# Patient Record
Sex: FEMALE | Race: WHITE | NOT HISPANIC OR LATINO | Employment: UNEMPLOYED | URBAN - METROPOLITAN AREA
[De-identification: names, ages, dates, MRNs, and addresses within clinical notes are randomized per-mention and may not be internally consistent; named-entity substitution may affect disease eponyms.]

---

## 2018-01-05 ENCOUNTER — OFFICE VISIT (OUTPATIENT)
Dept: URGENT CARE | Facility: URGENT CARE | Age: 23
End: 2018-01-05
Payer: COMMERCIAL

## 2018-01-05 VITALS
OXYGEN SATURATION: 97 % | HEIGHT: 59 IN | HEART RATE: 91 BPM | WEIGHT: 99 LBS | DIASTOLIC BLOOD PRESSURE: 81 MMHG | SYSTOLIC BLOOD PRESSURE: 120 MMHG | TEMPERATURE: 98.4 F | BODY MASS INDEX: 19.96 KG/M2

## 2018-01-05 DIAGNOSIS — J10.1 INFLUENZA A: Primary | ICD-10-CM

## 2018-01-05 LAB
FLUAV+FLUBV AG SPEC QL: NEGATIVE
FLUAV+FLUBV AG SPEC QL: POSITIVE
SPECIMEN SOURCE: ABNORMAL

## 2018-01-05 PROCEDURE — 87804 INFLUENZA ASSAY W/OPTIC: CPT | Performed by: PHYSICIAN ASSISTANT

## 2018-01-05 PROCEDURE — 99203 OFFICE O/P NEW LOW 30 MIN: CPT | Performed by: PHYSICIAN ASSISTANT

## 2018-01-05 RX ORDER — OSELTAMIVIR PHOSPHATE 75 MG/1
75 CAPSULE ORAL 2 TIMES DAILY
Qty: 10 CAPSULE | Refills: 0 | Status: SHIPPED | OUTPATIENT
Start: 2018-01-05 | End: 2018-01-10

## 2018-01-05 NOTE — MR AVS SNAPSHOT
"              After Visit Summary   2018    Oralia Patton    MRN: [id number]           Patient Information     Date Of Birth          1995        Visit Information        Provider Department      2018 5:40 PM Deidra Bernardo PA-C [facility]        Today's Diagnoses     Influenza A    -  1       Follow-ups after your visit        Who to contact     If you have questions or need follow up information about today's clinic visit or your schedule please contact [facility] directly at [phone].  Normal or non-critical lab and imaging results will be communicated to you by [employer]hart, letter or phone within 4 business days after the clinic has received the results. If you do not hear from us within 7 days, please contact the clinic through [place] or phone. If you have a critical or abnormal lab result, we will notify you by phone as soon as possible.  Submit refill requests through [employer] or call your pharmacy and they will forward the refill request to us. Please allow 3 business days for your refill to be completed.          Additional Information About Your Visit        MyChart Information     [employer]box lets you send messages to your doctor, view your test results, renew your prescriptions, schedule appointments and more. To sign up, go to [email or web address] . Click on \"Log in\" on the left side of the screen, which will take you to the Welcome page. Then click on \"Sign up Now\" on the right side of the page.     You will be asked to enter the access code listed below, as well as some personal information. Please follow the directions to create your username and password.     Your access code is: [email or web address]  Expires: 2018  8:46 PM     Your access code will  in 90 days. If you need help or a new code, please call your [facility] clinic or [phone].        Care EveryWhere ID     This is your Care EveryWhere ID. This could be used by other " "organizations to access your [facility] medical records  [id number]        Your Vitals Were     Pulse Temperature Height Last Period Pulse Oximetry Breastfeeding?    91 98.4  F (36.9  C) (Tympanic) 4' 11\" (1.499 m) 01/05/2018 97% No    BMI (Body Mass Index)                   20 kg/m2            Blood Pressure from Last 3 Encounters:   01/05/18 120/81   11/11/13 108/71    Weight from Last 3 Encounters:   01/05/18 99 lb (44.9 kg)   11/11/13 100 lb (45.4 kg) (3 %)*     * Growth percentiles are based on [facility] 2-20 Years data.              We Performed the Following     Influenza A/B antigen          Today's Medication Changes          These changes are accurate as of: 1/5/18  8:46 PM.  If you have any questions, ask your nurse or doctor.               Start taking these medicines.        Dose/Directions    oseltamivir 75 MG capsule   Commonly known as:  TAMIFLU   Used for:  Influenza A   Started by:  Deidra Bernardo PA-C        Dose:  75 mg   Take 1 capsule (75 mg) by mouth 2 times daily for 5 days   Quantity:  10 capsule   Refills:  0            Where to get your medicines      These medications were sent to [place] [address] [place] [address]     Phone:  [phone]     oseltamivir 75 MG capsule                Primary Care Provider Fax #    Physician No Ref-Primary [phone]       No address on file        Equal Access to Services     FRAN GRAHAM AH: Hadii jerri cox Somaikol, waaxda luqadaha, qaybta kaalmada adeyordanyada, daryl marshall . So [facility] [place] [facility] [phone].    ATENCIÓN: Si habla español, tiene a ibrahim disposición servicios gratuitos de asistencia lingüística. Llame al [phone].    We comply with applicable federal civil rights laws and [place] laws. We do not discriminate on the basis of race, color, national origin, age, disability, sex, sexual orientation, or gender identity.            Thank you!     Thank you for " choosing [facility]  for your care. Our goal is always to provide you with excellent care. Hearing back from our patients is one way we can continue to improve our services. Please take a few minutes to complete the written survey that you may receive in the mail after your visit with us. Thank you!             Your Updated Medication List - Protect others around you: Learn how to safely use, store and throw away your medicines at www.disposemymeds.org.          This list is accurate as of: 1/5/18  8:46 PM.  Always use your most recent med list.                   Brand Name Dispense Instructions for use Diagnosis    DEPO-PROVERA IM           diphenhydrAMINE 25 MG tablet    BENADRYL    10 tablet    Take 1 tablet (25 mg) by mouth every 6 hours as needed (Take with Compazine only if you have a headache.)        oseltamivir 75 MG capsule    TAMIFLU    10 capsule    Take 1 capsule (75 mg) by mouth 2 times daily for 5 days    Influenza A       prochlorperazine 10 MG tablet    COMPAZINE    10 tablet    Take 1 tablet (10 mg) by mouth every 6 hours as needed

## 2018-01-06 NOTE — PROGRESS NOTES
"HPI:  Oralia is a 21 yo female who presents for body aches, chills, feeling feverish x 2 days.  No temperature was taken.   Reports  Mild cough and mild sore throat.  Has been sweating a lot at night.  Patient took tylenol which helped.  She then took Ibuprofen today and her stomach started to feel an uncomfortable \"hunger type macarena\" and eventually she vomited.  This made her feel somewhat better and she is starting to feel that \"macarena\" again.  Pain is not severe.  Denies diarrhea.    Did not have flu shot this year.    ROS:  See HPI      PE:  Vitals & nursing notes reviewed. B/P: 120/81, T: 98.4, P: 91, R: Data Unavailable  Constitutional:  Alert, well nourished, well-developed, NAD  Head:  Atraumatic, normocephalic  Eyes:  Perrla, EOMI, conjunctiva:  Pink   Sclera:  Anicteric  Ears:  Canals clear BL, TM pearly BL  Throat:  No erythema, exudates, or edema to postoropharynx  Neck:  Supple, no cervical LAD  Lungs:  CTA, no wheezes, rhonchi, or rales  CV:  RRR,  no murmur appreciated  Abdomen:  Soft, Mild mid epigastric TTP, No HSM, No CVA tenderness, (+) bowel sounds,        ASSESSMENT:  (J10.1) Influenza A  (primary encounter diagnosis)  Plan: oseltamivir (TAMIFLU) 75 MG capsule        Rest.  Push fluids.  Tylenol or advil for pain, muscles aches, HA, & fever PRN.  Cool compress to forehead and back of neck to help reduce fevers.  Patient given printed handout on Influenza description and management per epic references.  Contagious - stay out of school and work for 5-7 days.  Must be afebrile for 24 hrs without use of antipyretics.    F/U with PCP if sx persist or worsen.           "

## 2018-01-06 NOTE — NURSING NOTE
"Chief Complaint   Patient presents with     Urgent Care     Nasal Congestion     c/o nasal congestion,bodyache and chills for 4 days       Initial /81  Pulse 91  Temp 98.4  F (36.9  C) (Tympanic)  Ht 4' 11\" (1.499 m)  Wt 99 lb (44.9 kg)  LMP 01/05/2018  SpO2 97%  Breastfeeding? No  BMI 20 kg/m2 Estimated body mass index is 20 kg/(m^2) as calculated from the following:    Height as of this encounter: 4' 11\" (1.499 m).    Weight as of this encounter: 99 lb (44.9 kg).  Medication Reconciliation: complete   Ivanna Rodriguez MA    "

## 2018-12-13 ASSESSMENT — MIFFLIN-ST. JEOR: SCORE: 1094.23

## 2018-12-14 ENCOUNTER — SURGERY - HEALTHEAST (OUTPATIENT)
Dept: SURGERY | Facility: CLINIC | Age: 23
End: 2018-12-14

## 2018-12-14 ENCOUNTER — ANESTHESIA - HEALTHEAST (OUTPATIENT)
Dept: SURGERY | Facility: CLINIC | Age: 23
End: 2018-12-14

## 2018-12-14 ASSESSMENT — MIFFLIN-ST. JEOR: SCORE: 1072

## 2018-12-17 ENCOUNTER — TELEPHONE (OUTPATIENT)
Dept: FAMILY MEDICINE | Facility: CLINIC | Age: 23
End: 2018-12-17

## 2018-12-17 NOTE — TELEPHONE ENCOUNTER
Called patient and left a message to call the clinic back, wanted to see how she was doing since she was discharged from the hospital.

## 2018-12-18 NOTE — TELEPHONE ENCOUNTER
Date of discharge: 12/14/18  Facility of discharge: [facility]  Patient concerns about condition: No concerns at this time.  Patient concerns about medications: No concerns at this time.  Full med reconciliation will be completed at clinic visit.  Patient concerns about transitioning: No concerns at this time.  Clinic office visit appointment date: 12/20/18 at 9 am w/ Dr. Oewns  Patient reminded to bring all medications (prescription and over-the-counter) to clinic appointment: Yes    BITA Jeff

## 2018-12-20 ENCOUNTER — OFFICE VISIT (OUTPATIENT)
Dept: PHARMACY | Facility: CLINIC | Age: 23
End: 2018-12-20
Payer: COMMERCIAL

## 2018-12-20 ENCOUNTER — OFFICE VISIT (OUTPATIENT)
Dept: FAMILY MEDICINE | Facility: CLINIC | Age: 23
End: 2018-12-20
Payer: COMMERCIAL

## 2018-12-20 VITALS
BODY MASS INDEX: 19.52 KG/M2 | HEIGHT: 58 IN | SYSTOLIC BLOOD PRESSURE: 94 MMHG | OXYGEN SATURATION: 95 % | TEMPERATURE: 97.9 F | RESPIRATION RATE: 16 BRPM | DIASTOLIC BLOOD PRESSURE: 62 MMHG | HEART RATE: 77 BPM | WEIGHT: 93 LBS

## 2018-12-20 VITALS
WEIGHT: 93 LBS | SYSTOLIC BLOOD PRESSURE: 94 MMHG | HEART RATE: 77 BPM | OXYGEN SATURATION: 95 % | TEMPERATURE: 97.9 F | RESPIRATION RATE: 16 BRPM | HEIGHT: 58 IN | DIASTOLIC BLOOD PRESSURE: 62 MMHG | BODY MASS INDEX: 19.52 KG/M2

## 2018-12-20 DIAGNOSIS — Z09 HOSPITAL DISCHARGE FOLLOW-UP: Primary | ICD-10-CM

## 2018-12-20 DIAGNOSIS — Z90.49 S/P APPENDECTOMY: ICD-10-CM

## 2018-12-20 PROBLEM — R33.9 RETENTION OF URINE: Status: RESOLVED | Noted: 2018-12-14 | Resolved: 2018-12-20

## 2018-12-20 PROBLEM — E87.6 HYPOKALEMIA: Status: RESOLVED | Noted: 2018-12-14 | Resolved: 2018-12-20

## 2018-12-20 PROBLEM — R33.9 RETENTION OF URINE: Status: ACTIVE | Noted: 2018-12-14

## 2018-12-20 PROBLEM — E87.6 HYPOKALEMIA: Status: ACTIVE | Noted: 2018-12-14

## 2018-12-20 ASSESSMENT — MIFFLIN-ST. JEOR
SCORE: 1060.57
SCORE: 1060.57

## 2018-12-20 NOTE — PROGRESS NOTES
SUBJECTIVE       Oralia Patton is a 23 year old female with a PMH significant for   Patient Active Problem List   Diagnosis     Chronic daily headache     Hypokalemia     Retention of urine     Short stature     S/P appendectomy   who presents for a post hospitalization follow up.     Hospital Follow-up Visit:    Hospital:  [facility]   Date of Admission: 12/14/18  Date of Discharge: 12/15/18  Reason(s) for Admission: Appendicitis     Follow up hospitaliztion  Initially presented with nausea, vomiting in the setting of increased alcohol intake. Started having abdominal pain. Had  Surgery same day and appendectomy, surgery went well. Had some urinary retention issue post surgery. Has been doing well. Has had some abdominal soreness since then but tolerable. No fevers or chills. No nausea. Vomiting or diarrhea. Discussed pathology reports that showed fibrosis and atrophy of the resected region.     She denies any significant medical history. She has had intermittent headache and does have a history of migraines in the past. She had [facility] about [facility] and [facility] ago[facility] She denies any side effects from this.             Problems taking medications regularly:  None       Post Discharge Medication Reconciliation: discharge medications reconciled and changed, per note/orders (see AVS).       Problems adhering to non-medication therapy:  None       Medications reviewed by: by PharmD    Summary of hospitalization:  [facility] hospital discharge summary reviewed  Diagnostic Tests/Treatments reviewed.  Follow up needed: none  Other Healthcare Providers Involved in Patient s Care:         None  Update since discharge: improved.  Plan of care communicated with patient          Patient speaks English, so an  was not used.    Medications and problem list have been reviewed and updated.         REVIEW OF SYSTEMS     General: No fevers, chills  Head: No headache, dizziness  Neck: No  "swallowing problems   Resp: No cough. No congestion, coryza  GI: No constipation, diarrhea, no nausea or vomiting  Skin: No rash    Family history  Denies any family history of illnesses.         OBJECTIVE     Vitals:    12/20/18 0915   BP: 94/62   Pulse: 77   Resp: 16   Temp: 97.9  F (36.6  C)   TempSrc: Oral   SpO2: 95%   Weight: 42.2 kg (93 lb)   Height: 1.48 m (4' 10.25\")     Body mass index is 19.27 kg/m .  Gen:  In NAD, good color, appears well hydrated  HEENT: No Scleral icterus or conjunctival injection  Neck: supple without lymphadenopathy  CV:  RRR  - no murmurs, age appropriate rate  Pulm:  CTAB, no wheezes/rales/rhonchi, good air entry   Abdomen: soft, nontender, no masses, no rebound, BS intact throughout. Notable for 3 port sites that appear to be well healing.   Skin: No rashes or lesions noted.    ASSESSMENT AND PLAN     Oralia was seen today for hospital f/u.    Diagnoses and all orders for this visit:    S/P appendectomy  Patient doing well after her appendectomy, doing well and symptoms now much improved.  Sites well healed C/D/I. Path reports showed that she had fibrosis and atrophy of the appendix, did not mention necrosis or rupture. Patient is new to our clinic and would like to continue follow up here. She is due for a pap and is not sure if she every had one. She will schedule an appointment to follow up on this.   - Will schedule an appointment to set up for full physical.     Options for treatment and/or follow-up care were reviewed with the patient who was engaged and actively involved in the decision making process and verbalized understanding of the options discussed and was satisfied with the final plan. Risks and benefits of plan were carefully reviewed.     Bill code 81952 if patient readmitted    I, Ayush Gaxiola, have discussed patient findings with attending physician Sulaiman Parra MD who was agreeable with plan.     Ayush Gaxiola, PGY2            "

## 2018-12-20 NOTE — PROGRESS NOTES
Transitional Care / Medication Management Note                                                       Orlaia was referred by Dr. Gaxiola for pharmacy services for TCM.    MEDICATION REVIEW:  Discussed all medication indications, dosage and effectiveness, adverse effects, and adherence with patient/caregiver.    Pt had meds with them: no  Pt had med list with them: no  Pt was knowledgeable about meds: yes  Medications set up by: Self  Medications administered by someone else (e.g., LTCF): No  Pt uses a medication box or automated dispenser: no  Called pharmacy to obtain or clarify med list:  no  Called HHN or LTCF to obtain or clarify med list:  no  Patient has been seen by PharmD in past 6 months:  no   Needed: no    Medication Discrepancies  Medications on EMR med list that pt is NOT taking:  yes, Benadryl, Medroxyprogesterone, Prochlorperazine  Medications pt IS taking that are NOT on EMR med list (e.g., from specialist, hospital): none  OTC meds/ dietary supplements pt taking on own that are NOT on EMR med list:  yes, Excedrin migraine  Dosage listed differently than how patient is taking: none  Frequency listed differently than how patient is taking: none  Duplicate medication on list (two occurrences of the same medication):  none  TOTAL NUMBER OF MEDICATION DISCREPANCIES:  4    The following medications were added in the hospital:    None  The following medications were discontinued in the hospital:    N/A  The following medications had dose/frequency changes in the hospital:    N/A    Subjective                                                       Patient reports the following problems or concerns with their medications:  none  Patient reports the following adverse reactions to medications:  none  Pt reports missing doses:  never  Additional subjective information (e.g., reason for visit, frequency of PRNs, reasons meds were D/C ed):    Post hospitalization follow-up due to appendectomy    Pt does  not take any meds on a daily basis    [facility]    Takes Excedrin migraine PRN about once a month for headaches    Objective                                                       Patient Active Problem List   Diagnosis     Chronic daily headache     Hypokalemia     Retention of urine     Short stature     S/P appendectomy       Current Outpatient Medications   Medication Sig Dispense Refill     MedroxyPROGESTERone Acetate (DEPO-PROVERA IM)        prochlorperazine (COMPAZINE) 10 MG tablet Take 1 tablet (10 mg) by mouth every 6 hours as needed (Patient not taking: Reported on 1/5/2018) 10 tablet 1       Social History     Tobacco Use     Smoking status: Never Smoker     Smokeless tobacco: Never Used   Substance Use Topics     Alcohol use: Not on file     Drug use: Not on file       CrCl cannot be calculated (No order found.).    No results found for: A1C  Last Comprehensive Metabolic Panel:  No results found for: NA, POTASSIUM, CHLORIDE, CO2, ANIONGAP, GLC, BUN, CR, GFRESTIMATED, JOSE    BP Readings from Last 3 Encounters:   12/20/18 94/62   12/20/18 94/62   01/05/18 120/81       The ASCVD Risk score (Yosef LOPEZ Jr., et al., 2013) failed to calculate for the following reasons:    The 2013 ASCVD risk score is only valid for ages 40 to 79    PHQ-9 score:  No flowsheet data found.      Assessment                                                       Med Rec - controlled     New pt with no chronic meds    Plan/Recommendations                                                       Updated medication list in the EMR; deleted meds patient no longer taking and added meds patient is now taking, and changed doses where there was a dose discrepancy.    All medications were reviewed and found to be indicated, effective, safe and convenient/ affordable unless drug therapy problem(s) was/were identified, as are described below.      Completed at this visit    Med Rec     Clarified all  meds and updated med list    Options for treatment and/or follow-up care were reviewed with the patient.  Oralia was engaged and actively involved in the decision making process, verbalized understanding of the options discussed, and was satisfied with the final plan.      Follow-up                                                       Patient should follow up with Dr. Gaxiola.  Patient was provided with written instructions/medication list via AVS.     Dr. Gaxiola was provided the recommendations above  in clinic today and Dr. Pérez was available for supervision during this visit and is the authorizing prescriber for this visit through the pharmacist collaborative practice agreement.    Shonda Sandoval, PharmD Resident      # of medical conditions addressed: 1  # of medications addressed: 4  # of medication discrepancies identified: 4  # of DTP identified: 0  Time spent: 15 minutes  Level of service: 1

## 2018-12-20 NOTE — PROGRESS NOTES
I have verified the content of the note, which accurately reflects my assessment of the patient and the plan of care.   Tere Suazo, PharmD

## 2018-12-20 NOTE — PROGRESS NOTES
"Preceptor attestation:  Vital signs reviewed: BP 94/62   Pulse 77   Temp 97.9  F (36.6  C) (Oral)   Resp 16   Ht 1.48 m (4' 10.25\")   Wt 42.2 kg (93 lb)   SpO2 95%   BMI 19.27 kg/m      Patient seen, evaluated, and discussed with the resident.  I have verified the content of the note, which accurately reflects my assessment of the patient and the plan of care.    Supervising physician: Shante Pérez MD  [facility]  "

## 2018-12-20 NOTE — PATIENT INSTRUCTIONS
Thank you for coming to [facility] for your care. It was a pleasure to take care of you!    - Great job on taking good care of your health, KEEP IT UP!  - So continue to take it easy until the soreness improves.   - I would like you to schedule the appointment for a full physical We will plan to do you pap at that time.     Ayush Gaxiola MD

## 2019-01-07 ENCOUNTER — OFFICE VISIT (OUTPATIENT)
Dept: FAMILY MEDICINE | Facility: CLINIC | Age: 24
End: 2019-01-07
Payer: COMMERCIAL

## 2019-01-07 VITALS
BODY MASS INDEX: 20.11 KG/M2 | DIASTOLIC BLOOD PRESSURE: 60 MMHG | HEART RATE: 69 BPM | HEIGHT: 58 IN | WEIGHT: 95.8 LBS | SYSTOLIC BLOOD PRESSURE: 97 MMHG | TEMPERATURE: 98.4 F

## 2019-01-07 DIAGNOSIS — Z23 NEED FOR VACCINATION: ICD-10-CM

## 2019-01-07 DIAGNOSIS — F12.10 TETRAHYDROCANNABINOL (THC) USE DISORDER, MILD, ABUSE: ICD-10-CM

## 2019-01-07 DIAGNOSIS — Z00.00 ROUTINE GENERAL MEDICAL EXAMINATION AT A HEALTH CARE FACILITY: Primary | ICD-10-CM

## 2019-01-07 DIAGNOSIS — Z12.4 SCREENING FOR CERVICAL CANCER: ICD-10-CM

## 2019-01-07 DIAGNOSIS — Z11.51 SCREENING FOR HUMAN PAPILLOMAVIRUS: ICD-10-CM

## 2019-01-07 LAB
BACTERIA: NORMAL
CLUE CELLS: NORMAL
HIV 1+2 AB+HIV1 P24 AG SERPL QL IA: NEGATIVE
MOTILE TRICHOMONAS: NEGATIVE
ODOR: NORMAL
PH WET PREP: NORMAL
WBC WET PREP: NORMAL
YEAST: NORMAL

## 2019-01-07 ASSESSMENT — MIFFLIN-ST. JEOR: SCORE: 1069.3

## 2019-01-07 NOTE — PROGRESS NOTES
Female Physical Note         HPI    She presents for   Chief Complaint   Patient presents with     Physical     Gyn Exam     Concerns today: No significant concerns.          Review of Systems:     CONSTITUTIONAL: No fatigue, unintentional weight loss, or fevers  HEENT: No concerns related to head, eyes, ears, nose or throat  CV: No chest pain, shortness of breath, or palpitations  RESPIRATORY: No cough  BREASTS: No masses or discharge  GI: No abdominal pain, nausea, vomiting, diarrhea or constipation  : No dysuria, urinary frequency, hematuria, or abnormal vaginal bleeding  MS: No muscle, bone, or joint pain  NEURO: No headache, dizziness, or weakness  ENDOCRINE: No temperature intolerance  HEME: No easy bruising or bleeding problems  PSYCHIATRIC: Negative for mood changes    Sexually Active: Yes, men, has had 2 partners in the past 3 months. Uses protection.   Sexual concerns: No, no prior hx of STDs  Contraception: Yes had a Mirena placed about one year and half ago.   Menses: No, as she has Mirena  STD History: No   Last Pap Smear Date: No  Abnormal Pap History: No     Patient Active Problem List   Diagnosis     S/P appendectomy       Current Outpatient Medications   Medication Sig Dispense Refill     aspirin-acetaminophen-caffeine (EXCEDRIN MIGRAINE) 250-250-65 MG tablet Take 1 tablet by mouth every 6 hours as needed for headaches       History reviewed. No pertinent past medical history.     Family History   Problem Relation Age of Onset     Cancer No family hx of      Diabetes No family hx of      Heart Disease No family hx of        Family History and past Medical History reviewed and unchanged/updated.  Social History     Tobacco Use     Smoking status: Never Smoker     Smokeless tobacco: Never Used   Substance Use Topics     Alcohol use: Not on file     Marital status: Not  Children: No  Employed:  Yes, Has been working at [employer].     Has anyone hurt you physically, for example  "by pushing, hitting, slapping or kicking you or forcing you to have sex? No   Do you feel threatened or controlled by a partner, ex-partner or anyone in your life? No     RISK BEHAVIORS AND HEALTHY HABITS:  Tobacco Use/Smoking: No   Illicit Drug Use: Yes, smokes THC, about a couple times a weel/   Do you use alcohol? Yes, about once a week about 2 drinks.   Diet (5-7 servings of fruits/veg daily): Yes   Exercise (30 min accumulated most days):Not currently has active summers.   Dental Care: Yes   Calcium in diet?:  Yes  Seat Belt Use: Yes     Immunization History   Administered Date(s) Administered     TDAP Vaccine (Boostrix) 01/07/2019    Reviewed Immunization Record Today         Physical Exam:     Patient Vitals for the past 24 hrs:   BP Temp Temp src Pulse Height Weight   01/07/19 1441 97/60 98.4  F (36.9  C) Oral 69 1.473 m (4' 10\") 43.5 kg (95 lb 12.8 oz)     Body mass index is 20.02 kg/m .    GENERAL: Alert, oriented, no acute distress, pleasant  HEENT: Eyes grossly normal, extraocular movements normal  NECK: No tenderness, lymphadenopathy, masses, or stiffness.  Thyroid normal to palpation.  RESP: Lungs clear to auscultation - no crackles, rhonchi, or wheezes  CV: Regular rate and rhythm, no rubs or clicks, no murmurs  BREASTS: No masses, tenderness, nipple discharge, or axillary lymphadenopathy  ABDOMEN: Soft, no tenderness, masses, or organ enlargement  MS: Extremities grossly normal, no edema, good range of motion  SKIN: No rashes or lesions  NEURO: DTRs symmetric  PELVIC: Cervix appears normal without lesions, no tenderness, appreciate short string of Mirena in place.  PSYCH: Normal mood and affect  Lymphatics: Normal cervical, axillary, and inguinal lymph nodes    Results for orders placed or performed in visit on 01/07/19   Wet Prep ([facility])   Result Value Ref Range    Yeast Wet Prep None none    Motile Trichomonas Wet Prep Negative Negative    Clue Cells Wet Prep None NONE    WBC WET PREP 2-5 2 - 5    " Bacteria Wet Prep Moderate None    pH Wet Prep Not performed 3.8 - 4.5    Odor Wet Prep None NONE     Assessment and Plan     Oralia was seen today for physical and gyn exam.    Diagnoses and all orders for this visit:    Routine general medical examination at a health care facility  Patient doing well today, no significant concerns.  Would like to get checked for STDs to make sure she is clear.  We will also check an HIV as well.  Due for Pap today and so this was completed, was able to appreciate short string of Mirena in place.  Will call patient with results.  -     HIV Ag/Ab Screen [facility] ([facility])  -     GYN Cytology ([facility])  -     Wet Prep ([facility])  -     Syphilis Screen [facility] - RPR ([facility])   -     Patient admits to smoking marijuana, recommend cutting down on this and ultimately stopping    Screening for human papillomavirus  -     Chlamydia/Gono Amplified ([facility])    Need for vaccination  -     ADMIN VACCINE, INITIAL  -     TDAP VACCINE (BOOSTRIX)    Options for treatment and/or follow-up care were reviewed with the patient. Oralai Patton was engaged and actively involved in the decision making process. She verbalized understanding of the options discussed and was satisfied with the final plan.    RTC in one year or sooner if develops new or worsening symptoms.    Patient discussed with attending physician Dr. Parmjit Gallegos who agrees withplan    Ayush Gaxiola, PGY2

## 2019-01-07 NOTE — PATIENT INSTRUCTIONS
Thank you for coming to [facility] for your care. It was a pleasure to take care of you!    - Great job on taking good care of your health, KEEP IT UP!  - I will call you with the results of your test.  - Return to clinic in one year for follow up or sooner  - Avoid THC use as much as possible.     Ayush Gaxiola MD

## 2019-01-08 ENCOUNTER — RECORDS - HEALTHEAST (OUTPATIENT)
Dept: ADMINISTRATIVE | Facility: OTHER | Age: 24
End: 2019-01-08

## 2019-01-08 LAB
C TRACH RRNA SPEC QL NAA+PROBE: NEGATIVE
CYTOLOGY CVX/VAG DOC THIN PREP: NORMAL
HIGH RISK?: NO
HPV REFLEX?: NO
LAB AP ABNORMAL BLEEDING: NO
LAB AP BIRTH CONTROL/HORMONES: NORMAL
LAB AP CASE REPORT: NORMAL
LAB AP CERVICAL APPEARANCE: NORMAL
LAB AP MALIGNANT?: NORMAL
LAB AP PATIENT STATUS: NORMAL
LAB AP PREVIOUS ABNL: NO
LAB AP PREVIOUS NORMAL: 2014
LAST MENS PERIOD: NORMAL
N GONORRHOEA RRNA SPEC QL NAA+PROBE: NEGATIVE
SPECIMEN ADEQUACY:: NORMAL
TREPONEMA ANTIBODY (SYPHILIS): NEGATIVE

## 2019-01-09 NOTE — RESULT ENCOUNTER NOTE
Bran Chan,    Please call the following patient with the results below. Thanks    Bran Ms. Patton,    I hope you're well. I wanted to communicate with you the results of the tests that we did.     All the testing we did on 01/07/19 came back normal including your wet prep to check for STDs, HIV, syphilis, chlamydia and gonorrhea.  Your check for cervical cancer was also completely normal.  We will plan to see you back in a year or sooner if you have any symptoms or concerns.    Ayush Gaxiola, PGY2

## 2019-01-10 NOTE — PROGRESS NOTES
Preceptor Attestation:   Patient seen, evaluated and discussed with the resident. I have verified the content of the note, which accurately reflects my assessment of the patient and the plan of care.   Supervising Physician:  Parmjit Gallegos MD

## 2019-02-27 ENCOUNTER — OFFICE VISIT (OUTPATIENT)
Dept: FAMILY MEDICINE | Facility: CLINIC | Age: 24
End: 2019-02-27
Payer: COMMERCIAL

## 2019-02-27 VITALS
HEART RATE: 104 BPM | DIASTOLIC BLOOD PRESSURE: 84 MMHG | RESPIRATION RATE: 14 BRPM | TEMPERATURE: 97.7 F | OXYGEN SATURATION: 98 % | SYSTOLIC BLOOD PRESSURE: 116 MMHG | WEIGHT: 95.8 LBS | BODY MASS INDEX: 20.02 KG/M2

## 2019-02-27 DIAGNOSIS — R10.13 EPIGASTRIC PAIN: Primary | ICD-10-CM

## 2019-02-27 LAB — HEMOGLOBIN: 13.5 G/DL (ref 11.7–15.7)

## 2019-02-27 NOTE — PROGRESS NOTES
"h p                    SUBJECTIVE       Oralia Patton is a 23 year old female with a PMH significant for   Patient Active Problem List   Diagnosis     S/P appendectomy     Tetrahydrocannabinol (THC) use disorder, mild, abuse    who presents for evaluation of abdominal pain.    Abdominal pain  Patient coming in today for evaluation of intermittent epigastric pain.  Of note, patient was hospitalized and had an appendectomy in December 2018 after she presented with epigastric pain.  Patient admitted at that time that she had gone out for her birthday and had a couple of drinks and had acute onset epigastric pain.  Imaging at that time had shown that there was suspicion for appendicitis and so patient went a lap appendectomy at that time.  Path reports did not evidence for gonzalo appendicitis.  Since that time, patient had avoided drinking however about 2 weeks ago did go out for her friend's birthday and had a few beers.  She had recurrence in the epigastric pain which she rates as being 7 out of 10 on the pain scale.  The day after that, patient had persistent vomiting and abdominal pain.  She also reports one episode of black tarry stool but unsure if that was related to chocolate cake that she had the day prior.  She denies any more history since that time dark stools or episodes of emesis, patient reports intermittent episodes of \"hunger pain\" that are currently .  She is not sure if food intake resolves her symptoms.      Patient speaks English, so an  was not used.    Medications and problem list have been reviewed and updated.         REVIEW OF SYSTEMS     General: No fevers, chills  Head: No headache, dizziness  Neck: No swallowing problems   Resp: No cough. No congestion.  GI: Positive for abdominal pain.  No constipation, diarrhea, no recent episodes of nausea or vomiting  Skin: No rash        OBJECTIVE     Vitals:    02/27/19 0921   BP: 116/84   Pulse: 104   Resp: 14   Temp: 97.7  F (36.5  C) "   TempSrc: Oral   SpO2: 98%   Weight: 43.5 kg (95 lb 12.8 oz)     Body mass index is 20.02 kg/m .  Gen:  In NAD, good color, appears well hydrated  HEENT: No Scleral icterus or conjunctival injection  CV:  RRR  - no murmurs, age appropriate rate  Pulm:  CTAB, no wheezes/rales/rhonchi, good air entry   Abdomen: soft, tender to palpation in the epigastric region, no rebound tenderness or masses appreciated.  Negative Antoine sign.  Bowel sounds hypoactive.   Skin: No rashes or lesions noted.    Results for orders placed or performed in visit on 02/27/19 (from the past 24 hour(s))   Hemoglobin (HGB) ([facility])   Result Value Ref Range    Hemoglobin 13.5 11.7 - 15.7 g/dL       ASSESSMENT AND PLAN     Oralia was seen today for abdominal pain.    Diagnoses and all orders for this visit:    Epigastric pain  Patient coming in today for intermittent epigastric pain, which she describes as hunger pain.  Had appendectomy in December that was thought to be secondary to ongoing appendicitis however had presented with similar symptoms.  Suspect that her symptoms could be related to GI ulcer versus gastritis in the setting of ongoing alcohol use.  I did recommend that patient cut back on her alcohol binge drinking.  She is agreeable to this.  Other differentials include appendicitis, gastritis, gallbladder pathology although less likely at this point.  Patient appears to be tachycardic on her vitals and so we will plan to do a hemoglobin check.  We will also plan to do an H. pylori recheck as a possible etiology.  If symptoms persist despite treatment with H2 blocker, may recommend that patient be seen by gastroenterology for further workup including possible upper endoscopy.  -     ranitidine (ZANTAC) 150 MG tablet; Take 1 tablet (150 mg) by mouth 2 times daily  -     H. Pylori Agn Fecal ([facility]); Future   -     Hemoglobin (HGB) ([facility])    Options for treatment and/or follow-up care were reviewed with the patient who was  engaged and actively involved in the decision making process and verbalized understanding of the options discussed and was satisfied with the final plan. Risks and benefits of plan were carefully reviewed.     I, Ayush Gaxiola, have discussed patient findings with attending physician Sulaiman Parra MD who was agreeable with plan.     Ayush Gaxiola, PGY2

## 2019-02-27 NOTE — PATIENT INSTRUCTIONS
Thank you for coming to [facility] for your care. It was a pleasure to take care of you!    - Avoid drinking as much as possible for now.  - Plan to start taking Zantac (ranitidine) 150 mg twice a day for the next month  - Provide stool sample for fecal antigen testing for H pylori  - Provide blood sample to check your hemoglobin.   - I will call you with the results of your test.  - Return to clinic in 2 weeks for follow up of symptoms.     Ayush Gaxiola MD

## 2019-02-27 NOTE — PROGRESS NOTES
Preceptor Attestation:   Patient seen, evaluated and discussed with the resident. I have verified the content of the note, which accurately reflects my assessment of the patient and the plan of care.   Supervising Physician:  Sulaiman Parra MD

## 2019-03-06 ENCOUNTER — TELEPHONE (OUTPATIENT)
Dept: FAMILY MEDICINE | Facility: CLINIC | Age: 24
End: 2019-03-06

## 2019-03-06 DIAGNOSIS — R10.13 EPIGASTRIC PAIN: ICD-10-CM

## 2019-03-06 NOTE — TELEPHONE ENCOUNTER
Called patient and relayed normal lab results and also reminded her to bring her H- Pylori sample in she asked if it needed to be more fresh because it has been in the freezer since Wednesday. Called the lab they said that she needs to do a new sample. Told the patient she needs to come into clinic to get a new card for the sample. She verbally understood and will come when she has time.

## 2019-03-08 LAB
H PYLORI ANTIGEN: NORMAL
REPORT STATUS: NORMAL
SPECIMEN DESCRIPTION: NORMAL

## 2019-03-11 NOTE — RESULT ENCOUNTER NOTE
Results of H pylori and Hgb were discussed with patient over phone and pt described understanding results and plan. Abdominal symptoms improving with taking Zantac.     Ayush Gaxiola

## 2019-11-07 ENCOUNTER — RECORDS - HEALTHEAST (OUTPATIENT)
Dept: LAB | Facility: CLINIC | Age: 24
End: 2019-11-07

## 2019-11-27 LAB — RABV NAB SER NT-ACNC: 0.7 IU/ML

## 2021-01-11 ENCOUNTER — RECORDS - HEALTHEAST (OUTPATIENT)
Dept: LAB | Facility: CLINIC | Age: 26
End: 2021-01-11

## 2021-01-11 LAB
ALBUMIN SERPL-MCNC: 4.2 G/DL (ref 3.5–5)
ALP SERPL-CCNC: 59 U/L (ref 45–120)
ALT SERPL W P-5'-P-CCNC: 10 U/L (ref 0–45)
ANION GAP SERPL CALCULATED.3IONS-SCNC: 9 MMOL/L (ref 5–18)
AST SERPL W P-5'-P-CCNC: 16 U/L (ref 0–40)
BILIRUB SERPL-MCNC: 0.5 MG/DL (ref 0–1)
BUN SERPL-MCNC: 9 MG/DL (ref 8–22)
CALCIUM SERPL-MCNC: 9 MG/DL (ref 8.5–10.5)
CHLORIDE BLD-SCNC: 106 MMOL/L (ref 98–107)
CO2 SERPL-SCNC: 26 MMOL/L (ref 22–31)
CREAT SERPL-MCNC: 0.67 MG/DL (ref 0.6–1.1)
GFR SERPL CREATININE-BSD FRML MDRD: >60 ML/MIN/1.73M2
GLUCOSE BLD-MCNC: 97 MG/DL (ref 70–125)
LIPASE SERPL-CCNC: 20 U/L (ref 0–52)
POTASSIUM BLD-SCNC: 4.1 MMOL/L (ref 3.5–5)
PROT SERPL-MCNC: 6.3 G/DL (ref 6–8)
SODIUM SERPL-SCNC: 141 MMOL/L (ref 136–145)

## 2021-01-14 ENCOUNTER — RECORDS - HEALTHEAST (OUTPATIENT)
Dept: LAB | Facility: CLINIC | Age: 26
End: 2021-01-14

## 2021-01-15 LAB — H PYLORI AG STL QL IA: NEGATIVE

## 2021-06-02 VITALS — BODY MASS INDEX: 19.17 KG/M2 | WEIGHT: 95.1 LBS | HEIGHT: 59 IN

## 2021-06-22 NOTE — ANESTHESIA POSTPROCEDURE EVALUATION
Patient: Oralia Patton  APPENDECTOMY, LAPAROSCOPIC  Anesthesia type: general    Patient location: [facility] Surgical Floor  Last vitals:   Vitals:    12/14/18 1634   BP: 103/59   Pulse: 73   Resp: 16   Temp: 36.9  C (98.4  F)   SpO2: 94%     Post vital signs: stable  Level of consciousness: awake and responds to simple questions  Post-anesthesia pain: pain controlled  Post-anesthesia nausea and vomiting: no  Pulmonary: unassisted, return to baseline  Cardiovascular: stable and blood pressure at baseline  Hydration: adequate  Anesthetic events: no    QCDR Measures:  ASA# 11 - Toshia-op Cardiac Arrest: ASA11B - Patient did NOT experience unanticipated cardiac arrest  ASA# 12 - Toshia-op Mortality Rate: ASA12B - Patient did NOT die  ASA# 13 - PACU Re-Intubation Rate: ASA13B - Patient did NOT require a new airway mgmt  ASA# 10 - Composite Anes Safety: ASA10A - No serious adverse event    Additional Notes:

## 2021-06-22 NOTE — ANESTHESIA PREPROCEDURE EVALUATION
Anesthesia Evaluation      Patient summary reviewed   No history of anesthetic complications     Airway   Mallampati: II   Pulmonary - negative ROS and normal exam                          Cardiovascular - negative ROS and normal exam  Exercise tolerance: > or = 4 METS  Rhythm: regular  Rate: normal,         Neuro/Psych - negative ROS     Endo/Other - negative ROS      GI/Hepatic/Renal - negative ROS      Other findings: Results for GEM ECKERT (MRN [id number]) as of 12/14/2018 00:52    12/13/2018 16:37  Sodium: 141  Potassium: 3.1 (L)  Chloride: 106  CO2: 19 (L)  Anion Gap, Calculation: 16  BUN: 13  Creatinine: 0.77  GFR MDRD Af Amer: >60  GFR MDRD Non Af Amer: >60  Calcium: 10.2  AST: 16  ALT: 15  ALBUMIN: 5.0  Protein, Total: 8.1 (H)  Alkaline Phosphatase: 69  Bilirubin, Total: 0.6  Glucose: 135 (H)  WBC: 18.9 (H)  RBC: 4.73  Hemoglobin: 14.7  Hematocrit: 43.2  MCV: 91  MCH: 31.1  MCHC: 34.0  RDW: 11.6  Platelets: 331  Results for GEM ECKERT (MRN [id number]) as of 12/14/2018 00:52    12/13/2018 16:37  Beta-hCG, Quantitative: <2        Dental - normal exam                        Anesthesia Plan  Planned anesthetic: general endotracheal  GAETT  Antiemetics  Toradol at the end of case if okay with surgeon  Soft bite block    Patient instructed that nose ring/earings could cause injury or get lost during surgery - patient accepts the risk  ASA 1 - emergent   Induction: intravenous   Anesthetic plan and risks discussed with: patient  Anesthesia plan special considerations: rapid sequence induction,   Post-op plan: routine recovery

## 2021-06-22 NOTE — ANESTHESIA CARE TRANSFER NOTE
Last vitals:   Vitals:    12/14/18 0222   BP:    Pulse:    Resp:    Temp: 36.7  C (98.1  F)   SpO2:      Patient's level of consciousness is awake  Spontaneous respirations: yes  Maintains airway independently: yes  Dentition unchanged: yes  Oropharynx: oropharynx clear of all foreign objects    QCDR Measures:  ASA# 20 - Surgical Safety Checklist: WHO surgical safety checklist completed prior to induction    PQRS# 430 - Adult PONV Prevention: 4558F - Pt received => 2 anti-emetic agents (different classes) preop & intraop  ASA# 8 - Peds PONV Prevention: NA - Not pediatric patient, not GA or 2 or more risk factors NOT present  PQRS# 424 - Toshia-op Temp Management: 4559F - At least one body temp DOCUMENTED => 35.5C or 95.9F within required timeframe  PQRS# 426 - PACU Transfer Protocol: - Transfer of care checklist used  ASA# 14 - Acute Post-op Pain: ASA14B - Patient did NOT experience pain >= 7 out of 10

## 2021-06-22 NOTE — ANESTHESIA POSTPROCEDURE EVALUATION
Patient: Oralia Patton  APPENDECTOMY, LAPAROSCOPIC  Anesthesia type: general    Patient location: PACU  Last vitals:   Vitals:    12/14/18 0230   BP: 105/65   Pulse: 94   Resp: 15   Temp:    SpO2: 97%     Post vital signs: stable  Level of consciousness: awake and responds to simple questions  Post-anesthesia pain: pain controlled  Post-anesthesia nausea and vomiting: no  Pulmonary: unassisted, return to baseline  Cardiovascular: stable and blood pressure at baseline  Hydration: adequate  Anesthetic events: no    QCDR Measures:  ASA# 11 - Toshia-op Cardiac Arrest: ASA11B - Patient did NOT experience unanticipated cardiac arrest  ASA# 12 - Toshia-op Mortality Rate: ASA12B - Patient did NOT die  ASA# 13 - PACU Re-Intubation Rate: ASA13B - Patient did NOT require a new airway mgmt  ASA# 10 - Composite Anes Safety: ASA10A - No serious adverse event    Additional Notes: